# Patient Record
Sex: FEMALE | Race: WHITE
[De-identification: names, ages, dates, MRNs, and addresses within clinical notes are randomized per-mention and may not be internally consistent; named-entity substitution may affect disease eponyms.]

---

## 2017-02-15 ENCOUNTER — HOSPITAL ENCOUNTER (OUTPATIENT)
Dept: HOSPITAL 80 - FIMAGING | Age: 72
End: 2017-02-15
Attending: GENERAL ACUTE CARE HOSPITAL
Payer: COMMERCIAL

## 2017-02-15 DIAGNOSIS — Z12.31: Primary | ICD-10-CM

## 2017-02-15 DIAGNOSIS — Z85.3: ICD-10-CM

## 2017-02-15 PROCEDURE — G0202 SCR MAMMO BI INCL CAD: HCPCS

## 2017-09-06 ENCOUNTER — HOSPITAL ENCOUNTER (EMERGENCY)
Dept: HOSPITAL 80 - FED | Age: 72
Discharge: HOME | End: 2017-09-06
Payer: COMMERCIAL

## 2017-09-06 VITALS — HEART RATE: 66 BPM | OXYGEN SATURATION: 96 % | DIASTOLIC BLOOD PRESSURE: 77 MMHG | SYSTOLIC BLOOD PRESSURE: 126 MMHG

## 2017-09-06 VITALS — RESPIRATION RATE: 16 BRPM | TEMPERATURE: 98.2 F

## 2017-09-06 DIAGNOSIS — J20.9: Primary | ICD-10-CM

## 2017-09-06 DIAGNOSIS — Z87.891: ICD-10-CM

## 2017-09-06 NOTE — CPEKG
Heart Rate: 59

RR Interval: 1017

P-R Interval: 136

QRSD Interval: 74

QT Interval: 400

QTC Interval: 397

P Axis: 37

QRS Axis: 65

T Wave Axis: 48

EKG Severity - NORMAL ECG -

EKG Impression: SINUS RHYTHM

Electronically Signed By: Pj Christy 06-Sep-2017 21:17:57

## 2017-09-06 NOTE — EDPHY
H & P


Time Seen by Provider: 09/06/17 20:29


HPI/ROS: 





CHIEF COMPLAINT:  Cough for 2 months





HISTORY OF PRESENT ILLNESS:  Patient is a cough for 2 months.  It was getting 

better and then started getting worse over the last 3 days.  She has had a sore 

throat and worse and more frequent cough.  She was pretty active this last 

weekend and spent 12 hours with her  doing ball room dancing over the 

weekend without any chest pain or arm weakness.  She went to urgent care today 

for cough but when she told the provider that she had a "few minutes of weakness

" in her arms when she was pulling scales out from under her bed at 11:00 a.m. 

they are concerned about acute coronary syndrome.  She denies chest pain or 

shortness of breath.  No hemoptysis.





REVIEW OF SYSTEMS:


Eye: no change in vision


ENT:  Mild sore throat for 3 days


Cardiac: no chest pain or syncope


Pulmonary:  HPI


Abdomen: no vomiting, diarrhea, abdominal pain


Musculoskeletal: no back pain or leg swelling


Skin: no rash


Neuro:  Very mild headache, no neck pain.


Constitutional: no fever


: no urinary symptoms





A comprehensive 10 point review of systems is otherwise negative aside from 

elements mentioned in the history of present illness.





PAST MEDICAL HISTORY:  Includes rheumatoid arthritis and hypertriglyceridemia.  

Lumpectomy in 2008 and hysterectomy 1003.





Social history:  Former smoker quit in 1978.  No recent foreign travel, was in 

[place] back in July.





General Appearance: Alert and conversant, cooperative.


Eyes: No scleral  icterus. 


ENT, Mouth: Normal mucous membranes.  Pharynx is slightly red but no tonsillar 

exudate.  No uvular swelling.  No trismus.


Respiratory: Normal respiratory effort, breath sounds equal, lungs are clear to 

auscultation.


Cardiovascular:  Regular rate and rhythm.


Gastrointestinal:  Abdomen is soft and non tender.


Neurological: Alert and oriented x3.   Normally conversant. Face symmetric, 

normal movement and sensation in all extremities.


Skin: Warm and dry, no rashes.


Musculoskeletal: No peripheral edema and no joint swelling.  No calf 

tenderness. normal range of motion of the neck


Psychiatric: Not agitated.





Emergency Department course/MDM:


Patient has acute cough and bronchitis, empiric treatment with azithromycin 

discussed as she is not up to date on her pertussis vaccine.


Normal EKG and troponin, her arm strength is normal now.  5/5 strength in 

triceps, biceps, wrist extensors, intrinsics.  I think acute coronary syndrome 

or stroke or cervical cord problem is unlikely.





Smoking Status: Former smoker


Constitutional: 


 Initial Vital Signs











Temperature (C)  36.8 C   09/06/17 19:22


 


Heart Rate  63   09/06/17 19:22


 


Respiratory Rate  16   09/06/17 19:22


 


Blood Pressure  141/67 H  09/06/17 19:22


 


O2 Sat (%)  97   09/06/17 19:22








 











O2 Delivery Mode               Room Air














Allergies/Adverse Reactions: 


 





No Known Allergies Allergy (Unverified 02/19/15 09:20)


 








Home Medications: 














 Medication  Instructions  Recorded


 


AZITHROMYCIN [Z-PACK] 250 mg PO DAILY #4 tab 09/06/17


 


FOLIC ACID  09/06/17


 


Gemfibrozil  09/06/17


 


Methotrexate  09/06/17














Medical Decision Making





- Diagnostics


EKG Interpretation: 





12-lead EKG interpreted by me; official reading is in trace master.  My 

interpretation is sinus rhythm rate 59 no ischemic changes.


Differential Diagnosis: 





Differential for cough considered including but not limited to bronchitis, 

influenza, pneumonia, CHF, medication side effect.





- Data Points


Laboratory Results: 


 











  09/06/17





  20:57


 


Troponin I  < 0.012 ng/mL ng/mL





   (0.000-0.034) 











Medications Given: 


 








Discontinued Medications





Azithromycin (Zithromax)  500 mg PO EDNOW ONE


   PRN Reason: Protocol


   Stop: 09/06/17 21:09


   Last Admin: 09/06/17 21:13 Dose:  500 mg








Departure





- Departure


Disposition: Home, Routine, Self-Care


Clinical Impression: 


 Cough





Acute bronchitis


Qualifiers:


 Bronchitis organism: unspecified organism Qualified Code(s): J20.9 - Acute 

bronchitis, unspecified





Condition: Good


Instructions:  Acute Cough (ED)


Referrals: 


Mu Brown MD [Primary Care Provider] - As per Instructions


Prescriptions: 


AZITHROMYCIN [Z-PACK] 250 mg PO DAILY #4 tab

## 2017-09-19 ENCOUNTER — HOSPITAL ENCOUNTER (OUTPATIENT)
Dept: HOSPITAL 80 - BMCIMAGING | Age: 72
End: 2017-09-19
Attending: INTERNAL MEDICINE
Payer: COMMERCIAL

## 2017-09-19 DIAGNOSIS — J98.01: Primary | ICD-10-CM

## 2017-09-19 DIAGNOSIS — R05: ICD-10-CM

## 2017-09-22 ENCOUNTER — HOSPITAL ENCOUNTER (OUTPATIENT)
Dept: HOSPITAL 80 - FIMAGING | Age: 72
End: 2017-09-22
Attending: INTERNAL MEDICINE
Payer: COMMERCIAL

## 2017-09-22 DIAGNOSIS — M81.0: Primary | ICD-10-CM

## 2017-12-16 ENCOUNTER — HOSPITAL ENCOUNTER (OUTPATIENT)
Dept: HOSPITAL 80 - FED | Age: 72
Setting detail: OBSERVATION
LOS: 1 days | Discharge: HOME | End: 2017-12-17
Attending: INTERNAL MEDICINE | Admitting: INTERNAL MEDICINE
Payer: COMMERCIAL

## 2017-12-16 DIAGNOSIS — J10.1: Primary | ICD-10-CM

## 2017-12-16 LAB
% IMMATURE GRANULYOCYTES: 0.4 % (ref 0–1.1)
ABSOLUTE IMMATURE GRANULOCYTES: 0.03 10^3/UL (ref 0–0.1)
ABSOLUTE NRBC COUNT: 0 10^3/UL (ref 0–0.01)
ADD DIFF?: NO
ADD MORPH?: NO
ADD SCAN?: NO
ALBUMIN SERPL-MCNC: 3.8 G/DL (ref 3.5–5)
ALP SERPL-CCNC: 82 IU/L (ref 38–126)
ALT SERPL-CCNC: 33 IU/L (ref 9–52)
ANION GAP SERPL CALC-SCNC: 15 MEQ/L (ref 8–16)
APTT BLD: 34.3 SEC (ref 23–38)
AST SERPL-CCNC: 44 IU/L (ref 14–46)
ATYPICAL LYMPHOCYTE FLAG: 20 (ref 0–99)
BILIRUB SERPL-MCNC: 0.7 MG/DL (ref 0.1–1.4)
BILIRUBIN-CONJUGATED: 0.4 MG/DL (ref 0–0.5)
BILIRUBIN-UNCONJUGATED: 0.3 MG/DL (ref 0–1.1)
CALCIUM SERPL-MCNC: 9 MG/DL (ref 8.5–10.4)
CHLORIDE SERPL-SCNC: 97 MEQ/L (ref 97–110)
CO2 SERPL-SCNC: 27 MEQ/L (ref 22–31)
CREAT SERPL-MCNC: 0.8 MG/DL (ref 0.6–1)
CREATINE KINASE-MB FRACTION: 0.92 NG/ML (ref 0–3.19)
ERYTHROCYTE [DISTWIDTH] IN BLOOD BY AUTOMATED COUNT: 14.3 % (ref 11.5–15.2)
FRAGMENT RBC FLAG: 0 (ref 0–99)
GFR SERPL CREATININE-BSD FRML MDRD: > 60 ML/MIN/{1.73_M2}
GLUCOSE SERPL-MCNC: 99 MG/DL (ref 70–100)
HCT VFR BLD CALC: 35.2 % (ref 38–47)
HGB BLD-MCNC: 11.9 G/DL (ref 12.6–16.3)
INR PPP: 1.14 (ref 0.83–1.16)
LEFT SHIFT FLG: 0 (ref 0–99)
LIPEMIA HEMOLYSIS FLAG: 90 (ref 0–99)
MAGNESIUM SERPL-MCNC: 2.3 MG/DL (ref 1.6–2.3)
MCH RBC BLDCO QN: 31.9 PG (ref 27.9–34.1)
MCHC RBC AUTO-ENTMCNC: 33.8 G/DL (ref 32.4–36.7)
MCV RBC AUTO: 94.4 FL (ref 81.5–99.8)
NRBC-AUTO%: 0 % (ref 0–0.2)
PLATELET # BLD: 272 10^3/UL (ref 150–400)
PLATELET CLUMPS FLAG: 0 (ref 0–99)
PMV BLD AUTO: 9.3 FL (ref 8.7–11.7)
POTASSIUM SERPL-SCNC: 3.7 MEQ/L (ref 3.5–5.2)
PROT SERPL-MCNC: 7 G/DL (ref 6.3–8.2)
PROTHROMBIN TIME: 14.8 SEC (ref 12–15)
RBC # BLD AUTO: 3.73 10^6/UL (ref 4.18–5.33)
SODIUM SERPL-SCNC: 139 MEQ/L (ref 134–144)
TROPONIN I SERPL-MCNC: < 0.012 NG/ML (ref 0–0.03)

## 2017-12-16 PROCEDURE — G0378 HOSPITAL OBSERVATION PER HR: HCPCS

## 2017-12-16 PROCEDURE — 71020: CPT

## 2017-12-16 PROCEDURE — 93005 ELECTROCARDIOGRAM TRACING: CPT

## 2017-12-16 RX ADMIN — GUAIFENESIN SCH: 600 TABLET, EXTENDED RELEASE ORAL at 21:40

## 2017-12-16 RX ADMIN — OSELTAMIVIR PHOSPHATE SCH MG: 75 CAPSULE ORAL at 20:32

## 2017-12-16 NOTE — EDPHY
H & P


Stated Complaint: FEVER, COUGH, BODY ACHES FOR ONE WEEK


HPI/ROS: 





HPI





CHIEF COMPLAINT:  Fever cough, body aches





HISTORY OF PRESENT ILLNESS:  This patient is a 72-year-old female, presents 

emergency room with worsening cough muscle aches and joint pain. Patient 

reports she recently saw her primary care doctor 2 days ago and was diagnosed 

with a sinus infection started on Augmentin.  She states for the past 5 months 

she has had a chronic cough however over the last week she has had a worsening 

cough with productive sputum green color in nature, with a fever today of 101, 

additionally had muscle aches and joint pain. She states she feels unwell.


She denies any vomiting.  Denies diarrhea.  Denies chest pain.  Denies any 

significant shortness of breath.





Of note upon arrival to the emergency room she is noted to be hypoxic 86%.





Past Medical History:  High triglycerides, rheumatoid arthritis on methotrexate





Past Surgical History:  Lumpectomy, hysterectomy





Social History:  Denies daily use of drugs alcohol tobacco products.





Family History:  Noncontributory








ROS   


REVIEW OF SYSTEMS:


A comprehensive 10 point review of systems is otherwise negative aside from 

elements mentioned in the history of present illness.








Exam   


Constitutional  appears nontoxic triage nursing summary reviewed, vital signs 

reviewed, awake/alert.  Vital signs noted to be hypoxic.  86%.


Eyes   normal conjunctivae and sclera, EOMI, PERRLA. 


HENT   normal inspection, atraumatic, moist mucus membranes, no epistaxis, neck 

supple/ no meningismus, no raccoon eyes. 


Respiratory  bronchitic sounding cough, clear to auscultation bilaterally, 

normal breath sounds, no respiratory distress, no wheezing. 


Cardiovascular   rate normal, regular rhythm, no murmur, no edema, distal 

pulses normal. 


Gastrointestinal   soft, non-tender, no rebound, no guarding, normal bowel 

sounds, no distension, no pulsatile mass. 


Genitourinary   no CVA tenderness. 


Musculoskeletal  no midline vertebral tenderness, full range of motion, no calf 

swelling, no tenderness of extremities, no meningismus, good pulses, 

neurovascularly intact.


Skin   pink, warm, & dry, no rash, skin atraumatic. 


Neurologic   awake, alert and oriented x 3, AAOx3, moves all 4 extremities 

equally, motor intact, sensory intact, CN II-XII intact, normal cerebellar, 

normal vision, normal speech. 


Psychiatric   normal mood/affect. 


Heme/Lymph/Immune   no lymphadenopathy.





Differential Diagnosis:  Includes but is not limited to in a particular order 

pneumonia, sepsis, bacteremia, dehydration, electrolyte disturbance, pulmonary 

embolism, CHF, influenza





Medical Decision Making:  Plan for this patient IV establishment IV fluid bolus

, full cardiac monitor, EKG, supplemental oxygen, lactic acid, blood cultures, 

two view chest x-ray to rule pneumonia, check influenza re-evaluate.





Re-evaluation:








EKG interpretation by me on record in [employer] system.  Impression time of 

EKG 16 20, this is sinus rhythm rate of 71.  There is no acute ischemic 

changes.  Q-waves noted V1 V2 V3.  Otherwise no ST elevation.





Patient's chest x-ray two view reviewed.  This shows a left lower lobe 

pneumonia.





Patient be hospitalized for pneumonia in the setting of hypoxia.





I have ordered her IV Rocephin and IV azithromycin.





Blood cultures have been pulled as well as lactic acid.  She is receiving a 

fluid bolus.





1711:  Patient influenza a positive. Also left lower lobe pneumonia.  Will give 

a dose of Tamiflu.


Source: Patient





- Personal History


Current Tetanus Diphtheria and Acellular Pertussis (TDAP): Yes


Tetanus Vaccine Date: < 10 YEARS





- Medical/Surgical History


Hx Asthma: No


Hx Chronic Respiratory Disease: No


Hx Diabetes: No


Hx Cardiac Disease: No


Hx Renal Disease: No


Hx Cirrhosis: No


Hx Alcoholism: No


Hx HIV/AIDS: No


Hx Splenectomy or Spleen Trauma: No


Other PMH: RHEUMATOID ARTHRITIS, BRONCHOSPASMS





- Social History


Smoking Status: Former smoker


Constitutional: 


 Initial Vital Signs











Temperature (C)  37.5 C   12/16/17 14:16


 


Heart Rate  76   12/16/17 14:16


 


Respiratory Rate  16   12/16/17 14:16


 


Blood Pressure  99/54 L  12/16/17 14:16


 


O2 Sat (%)  86 L  12/16/17 14:16








 











O2 Delivery Mode               Room Air














Allergies/Adverse Reactions: 


 





No Known Allergies Allergy (Unverified 02/19/15 09:20)


 








Home Medications: 














 Medication  Instructions  Recorded


 


Folic Acid [Folic Acid 1 MG (*)] 2 mg PO DAILY 09/06/17


 


Gemfibrozil [Lopid 600 MG (*)] 600 mg PO BIDAC 09/06/17


 


Methotrexate Sodium [Rheumatrex] 10 mg PO MO 09/06/17


 


Albuterol [Proventil Inhaler HFA 2 puffs IH Q4 PRN 12/16/17





(*)]  


 


Amox Tr/K Clav (Augmentin) 500 mg PO Q8 12/16/17





[Augmentin 500/125 MG TAB (*)]  


 


Montelukast Sodium [Singulair 10 10 mg PO DAILY 12/16/17





mg (*)]  














Medical Decision Making





- Diagnostics


Imaging Results: 


 Imaging Impressions





Chest X-Ray  12/16/17 15:51


Impression: Left lower lobe pneumonia.


 














- Data Points


Laboratory Results: 


 Laboratory Results





 12/16/17 15:30 





 12/16/17 15:30 





 











  12/16/17 12/16/17 12/16/17





  16:05 15:50 15:30


 


WBC      





    


 


RBC      





    


 


Hgb      





    


 


Hct      





    


 


MCV      





    


 


MCH      





    


 


MCHC      





    


 


RDW      





    


 


Plt Count      





    


 


MPV      





    


 


Neut % (Auto)      





    


 


Lymph % (Auto)      





    


 


Mono % (Auto)      





    


 


Eos % (Auto)      





    


 


Baso % (Auto)      





    


 


Nucleat RBC Rel Count      





    


 


Absolute Neuts (auto)      





    


 


Absolute Lymphs (auto)      





    


 


Absolute Monos (auto)      





    


 


Absolute Eos (auto)      





    


 


Absolute Basos (auto)      





    


 


Absolute Nucleated RBC      





    


 


Immature Gran %      





    


 


Immature Gran #      





    


 


PT      14.8 SEC SEC





     (12.0-15.0) 


 


INR      1.14 





     (0.83-1.16) 


 


APTT      34.3 SEC SEC





     (23.0-38.0) 


 


VBG Lactic Acid    1.0 mmol/L mmol/L  





    (0.7-2.1)  


 


Sodium      





    


 


Potassium      





    


 


Chloride      





    


 


Carbon Dioxide      





    


 


Anion Gap      





    


 


BUN      





    


 


Creatinine      





    


 


Estimated GFR      





    


 


Glucose      





    


 


Calcium      





    


 


Magnesium      





    


 


Total Bilirubin      





    


 


Conjugated Bilirubin      





    


 


Unconjugated Bilirubin      





    


 


AST      





    


 


ALT      





    


 


Alkaline Phosphatase      





    


 


Creatine Kinase      





    


 


CK-MB (CK-2) Fraction      





    


 


Troponin I      





    


 


NT-Pro-B Natriuret Pep      





    


 


Total Protein      





    


 


Albumin      





    


 


Lipase      





    


 


Nasal Influenza A PCR  FLU A DETECTED     





   (NEGATIVE)   


 


Nasal Influenza B PCR  NEGATIVE FOR FLU B     





   (NEGATIVE)   














  12/16/17 12/16/17





  15:30 15:30


 


WBC    8.18 10^3/uL 10^3/uL





    (3.80-9.50) 


 


RBC    3.73 10^6/uL L 10^6/uL





    (4.18-5.33) 


 


Hgb    11.9 g/dL L g/dL





    (12.6-16.3) 


 


Hct    35.2 % L %





    (38.0-47.0) 


 


MCV    94.4 fL fL





    (81.5-99.8) 


 


MCH    31.9 pg pg





    (27.9-34.1) 


 


MCHC    33.8 g/dL g/dL





    (32.4-36.7) 


 


RDW    14.3 % %





    (11.5-15.2) 


 


Plt Count    272 10^3/uL 10^3/uL





    (150-400) 


 


MPV    9.3 fL fL





    (8.7-11.7) 


 


Neut % (Auto)    81.2 % H %





    (39.3-74.2) 


 


Lymph % (Auto)    8.8 % L %





    (15.0-45.0) 


 


Mono % (Auto)    9.4 % %





    (4.5-13.0) 


 


Eos % (Auto)    0.0 % L %





    (0.6-7.6) 


 


Baso % (Auto)    0.2 % L %





    (0.3-1.7) 


 


Nucleat RBC Rel Count    0.0 % %





    (0.0-0.2) 


 


Absolute Neuts (auto)    6.64 10^3/uL H 10^3/uL





    (1.70-6.50) 


 


Absolute Lymphs (auto)    0.72 10^3/uL L 10^3/uL





    (1.00-3.00) 


 


Absolute Monos (auto)    0.77 10^3/uL 10^3/uL





    (0.30-0.80) 


 


Absolute Eos (auto)    0.00 10^3/uL L 10^3/uL





    (0.03-0.40) 


 


Absolute Basos (auto)    0.02 10^3/uL 10^3/uL





    (0.02-0.10) 


 


Absolute Nucleated RBC    0.00 10^3/uL 10^3/uL





    (0-0.01) 


 


Immature Gran %    0.4 % %





    (0.0-1.1) 


 


Immature Gran #    0.03 10^3/uL 10^3/uL





    (0.00-0.10) 


 


PT    





   


 


INR    





   


 


APTT    





   


 


VBG Lactic Acid    





   


 


Sodium  139 mEq/L mEq/L  





   (134-144)  


 


Potassium  3.7 mEq/L mEq/L  





   (3.5-5.2)  


 


Chloride  97 mEq/L mEq/L  





   ()  


 


Carbon Dioxide  27 mEq/l mEq/l  





   (22-31)  


 


Anion Gap  15 mEq/L mEq/L  





   (8-16)  


 


BUN  20 mg/dL mg/dL  





   (7-23)  


 


Creatinine  0.8 mg/dL mg/dL  





   (0.6-1.0)  


 


Estimated GFR  > 60   





   


 


Glucose  99 mg/dL mg/dL  





   ()  


 


Calcium  9.0 mg/dL mg/dL  





   (8.5-10.4)  


 


Magnesium  2.3 mg/dL mg/dL  





   (1.6-2.3)  


 


Total Bilirubin  0.7 mg/dL mg/dL  





   (0.1-1.4)  


 


Conjugated Bilirubin  0.4 mg/dL mg/dL  





   (0.0-0.5)  


 


Unconjugated Bilirubin  0.3 mg/dL mg/dL  





   (0.0-1.1)  


 


AST  44 IU/L IU/L  





   (14-46)  


 


ALT  33 IU/L IU/L  





   (9-52)  


 


Alkaline Phosphatase  82 IU/L IU/L  





   ()  


 


Creatine Kinase  96 IU/L IU/L  





   (0-156)  


 


CK-MB (CK-2) Fraction  0.92 ng/mL ng/mL  





   (0.00-3.19)  


 


Troponin I  < 0.012 ng/mL ng/mL  





   (0.000-0.034)  


 


NT-Pro-B Natriuret Pep  1740 pg/mL H pg/mL  





   (0-125)  


 


Total Protein  7.0 g/dL g/dL  





   (6.3-8.2)  


 


Albumin  3.8 g/dL g/dL  





   (3.5-5.0)  


 


Lipase  70 IU/L IU/L  





   ()  


 


Nasal Influenza A PCR    





   


 


Nasal Influenza B PCR    





   











Medications Given: 


 





Ceftriaxone Sodium/Dextrose (Rocephin 1 Gm (Premix))  50 mls @ 100 mls/hr IV 

EDNOW ONE


   PRN Reason: Protocol


   Stop: 12/16/17 17:28


   Last Admin: 12/16/17 17:07 Dose:  50 mls





Discontinued Medications





Sodium Chloride (Ns)  1,000 mls @ 0 mls/hr IV EDNOW ONE; [facility]


   PRN Reason: Protocol


   Stop: 12/16/17 15:52


   Last Admin: 12/16/17 16:04 Dose:  1,000 mls


Ondansetron HCl (Zofran)  4 mg IVP EDNOW ONE


   Stop: 12/16/17 15:42


   Last Admin: 12/16/17 15:48 Dose:  4 mg








Departure





- Departure


Disposition: [facility]


Clinical Impression: 


 Hypoxia, Influenza A





Pneumonia


Qualifiers:


 Pneumonia type: due to unspecified organism Laterality: left Lung location: 

lower lobe of lung Qualified Code(s): J18.1 - Lobar pneumonia, unspecified 

organism





Condition: Fair


Referrals: 


Mu Brown MD [Primary Care Provider] - As per Instructions

## 2017-12-16 NOTE — GHP
[f rep st]



                                                            HISTORY AND PHYSICAL





DATE OF ADMISSION:  12/16/2017



CHIEF COMPLAINT:  Cough.



HISTORY OF PRESENT ILLNESS:  A 72-year-old female with somewhat chronic cough, presents with acute wo
rsening over the last few days associated with productive sputum, shortness of breath, fevers and rodriguez
lgias.  She was diagnosed with a sinus infection 2 days ago, started on Augmentin, but has not improv
ed.  She has a history of rheumatoid arthritis.



PAST MEDICAL/SURGICAL HISTORY:  

1.  Rheumatoid arthritis.

2.  Hypertriglyceridemia.

3.  Recent episode of shingles, currently off Valtrex.

4.  Lumpectomy.

5.  Hysterectomy.



MEDICATIONS:  Please see medication reconciliation.



ALLERGIES:  No known drug allergies.



FAMILY HISTORY:  No rheumatoid arthritis in the family.



SOCIAL HISTORY:  She practices choreographed ballroom dancing.  She does not drink or smoke.



REVIEW OF SYSTEMS:  A 10-point review of systems is conducted and is negative except per HPI.



PHYSICAL EXAMINATION:  VITAL SIGNS:  Blood pressure 99/54, heart rate 76, respiration rate 16, satura
ting 86% on room air.  Temperature is 37.5.  GENERAL:  The patient is a pleasant female who is restin
g comfortably.  No acute distress.  HEENT:  Shows her to be normocephalic, atraumatic.  CARDIOVASCULA
R:  Regular rate and rhythm.  No murmurs, rubs, or gallops.  PULMONARY:  Lungs clear to auscultation 
bilaterally.  ABDOMEN:  Soft, nontender, nondistended.  SKIN:  Shows no rash. 

GENITOURINARY:  Shows no Schwarz.  NEUROLOGIC:  Shows her to be alert and oriented x3.  She is moving a
ll extremities.  PSYCHIATRIC:  Shows normal mood and affect.



LABORATORY:  Hemoglobin 11.9, INR is normal, blood lactate 1.00, BNP is 1740. Influenza A PCR is posi
tive.



DATA:  

1.  I discussed this with Dr. Hi in the emergency department.  We will admit to med/surg.

2.  I personally viewed and interpreted her chest x-ray.  This shows a very small left lower lobe inf
iltrate.

3.  EKG, which I personally viewed and interpreted, shows sinus rhythm.  Normal EKG.



IMPRESSION/PLAN:  

1.  Influenza A positive:  We will treat her with Tamiflu.  She has a small infiltrate in the left ba
se on her x-ray. She has received antibiotics in the emergency department.  I will not continue antib
acterials for now; however, will check a procalcitonin.  If this is markedly elevated, would empirica
lly treat her for a bacterial pneumonia.  She had been taking Augmentin.  I will hold this at this po
int.

2.  Hypoxia:  This is likely due to underlying bronchospasm as well as influenza.  We will place her 
on supplemental oxygen as needed and follow closely.

3.  Rheumatoid arthritis:  Quiescent at this time.  Continue methotrexate.

4.  Recent diagnosis of shingles:  She is currently off antivirals.

5.  Hypertriglyceridemia:  Gemfibrozil.





Job #:  [id number]

## 2017-12-16 NOTE — CPEKG
Heart Rate: 71

RR Interval: 845

P-R Interval: 136

QRSD Interval: 80

QT Interval: 384

QTC Interval: 418

P Axis: 36

QRS Axis: 76

T Wave Axis: 51

EKG Severity - NORMAL ECG -

EKG Impression: SINUS RHYTHM

Electronically Signed By: Ramon Hi 16-Dec-2017 22:51:04

## 2017-12-17 VITALS
HEART RATE: 70 BPM | TEMPERATURE: 98.8 F | OXYGEN SATURATION: 95 % | SYSTOLIC BLOOD PRESSURE: 105 MMHG | DIASTOLIC BLOOD PRESSURE: 50 MMHG | RESPIRATION RATE: 16 BRPM

## 2017-12-17 LAB
COLOR UR: YELLOW
NITRITE UR QL STRIP: NEGATIVE
PH UR STRIP: 5 [PH] (ref 5–7.5)
SP GR UR STRIP: 1.02 (ref 1–1.03)

## 2017-12-17 RX ADMIN — GUAIFENESIN SCH MG: 600 TABLET, EXTENDED RELEASE ORAL at 10:12

## 2017-12-17 RX ADMIN — OSELTAMIVIR PHOSPHATE SCH MG: 75 CAPSULE ORAL at 07:55

## 2017-12-17 NOTE — ASDISCHSUM
----------------------------------------------

Discharge Information

----------------------------------------------

Plan Status:Home with No Needs                       Medically Cleared to Leave:12/16/2017

Discharge Date:12/17/2017 11:58 AM                   CM D/C Disposition:

ADT D/C Disposition:Home, Routine, Self-Care         Projected Discharge Date:12/17/2017 12:00 AM

Transportation at D/C:                               Discharge Delay Reason:

Follow-Up Date:12/17/2017 12:00 AM                   Discharge Slot:

Final Diagnosis:

----------------------------------------------

Placement Information

----------------------------------------------

----------------------------------------------

Patient Contact Information

----------------------------------------------

Contact Name:TEENA                               Relationship:

Address:[address]                              Home [phone]

                                                     Work [phone]

City:[place]                                         Alternate Phone:

[facility]/Zip Code:[place] [address]                              Email:

----------------------------------------------

Financial Information

----------------------------------------------

Financial Class:MC

Primary Plan Desc:MEDICARE OUTPATIENT                Primary Plan Number:[id number]

Secondary Plan Desc:[employer] PPO                    Secondary Plan Number:[id number]

 

 

----------------------------------------------

Assessment Information

----------------------------------------------

----------------------------------------------

Intervention Information

----------------------------------------------

## 2017-12-17 NOTE — GDS
[f rep st]



                                                             DISCHARGE SUMMARY





DISCHARGE DIAGNOSIS:  Influenza A.



PHYSICAL EXAM:  GENERAL:  The patient is alert.  VITAL SIGNS:  Afebrile at 37.1, pulse is 70, respira
tory rate 16, blood pressure is 105/50, she is saturating greater than 90% on room air.  I have seen 
and evaluated the patient on the day of discharge.



HOSPITAL COURSE:  The patient is a 72-year-old female who presented to the emergency with complaints 
of cough.  She was evaluated and diagnosed with influenza A.  During this hospitalization, she receiv
ed supportive care.  She was initiated on Tamiflu, and is feeling significantly better.  She does hav
e a history of rheumatoid arthritis, as well as shingles.  She will continue her outpatient medicatio
ns and follow up with her primary care provider.



DISCHARGE MEDICATIONS:  Again, I have provided her a prescription for Tamiflu.  I have not adjusted t
he patient's previously prescribed home medications.





Job #:  [id number]

## 2018-02-16 ENCOUNTER — HOSPITAL ENCOUNTER (OUTPATIENT)
Dept: HOSPITAL 80 - FIMAGING | Age: 73
End: 2018-02-16
Attending: INTERNAL MEDICINE
Payer: COMMERCIAL

## 2018-02-16 DIAGNOSIS — Z12.31: Primary | ICD-10-CM

## 2018-02-16 DIAGNOSIS — Z85.3: ICD-10-CM

## 2019-02-19 ENCOUNTER — HOSPITAL ENCOUNTER (OUTPATIENT)
Dept: HOSPITAL 80 - FIMAGING | Age: 74
End: 2019-02-19
Attending: INTERNAL MEDICINE
Payer: COMMERCIAL

## 2019-02-19 DIAGNOSIS — Z85.3: ICD-10-CM

## 2019-02-19 DIAGNOSIS — Z12.31: Primary | ICD-10-CM
